# Patient Record
Sex: FEMALE | Race: OTHER | HISPANIC OR LATINO | URBAN - METROPOLITAN AREA
[De-identification: names, ages, dates, MRNs, and addresses within clinical notes are randomized per-mention and may not be internally consistent; named-entity substitution may affect disease eponyms.]

---

## 2018-11-20 ENCOUNTER — EMERGENCY (EMERGENCY)
Facility: HOSPITAL | Age: 58
LOS: 1 days | Discharge: HOME | End: 2018-11-20
Attending: EMERGENCY MEDICINE
Payer: MEDICAID

## 2018-11-20 VITALS
RESPIRATION RATE: 18 BRPM | SYSTOLIC BLOOD PRESSURE: 121 MMHG | HEART RATE: 60 BPM | TEMPERATURE: 97 F | DIASTOLIC BLOOD PRESSURE: 69 MMHG | OXYGEN SATURATION: 99 %

## 2018-11-20 VITALS — WEIGHT: 154.1 LBS | HEIGHT: 64 IN

## 2018-11-20 DIAGNOSIS — Z90.49 ACQUIRED ABSENCE OF OTHER SPECIFIED PARTS OF DIGESTIVE TRACT: Chronic | ICD-10-CM

## 2018-11-20 DIAGNOSIS — Z98.890 OTHER SPECIFIED POSTPROCEDURAL STATES: Chronic | ICD-10-CM

## 2018-11-20 DIAGNOSIS — Z98.49 CATARACT EXTRACTION STATUS, UNSPECIFIED EYE: Chronic | ICD-10-CM

## 2018-11-20 LAB
ALBUMIN SERPL ELPH-MCNC: 3.4 G/DL — LOW (ref 3.5–5)
ALP SERPL-CCNC: 82 U/L — SIGNIFICANT CHANGE UP (ref 40–120)
ALT FLD-CCNC: 68 U/L DA — HIGH (ref 10–60)
ANION GAP SERPL CALC-SCNC: 6 MMOL/L — SIGNIFICANT CHANGE UP (ref 5–17)
AST SERPL-CCNC: 58 U/L — HIGH (ref 10–40)
BASOPHILS # BLD AUTO: 0.1 K/UL — SIGNIFICANT CHANGE UP (ref 0–0.2)
BASOPHILS NFR BLD AUTO: 1.1 % — SIGNIFICANT CHANGE UP (ref 0–2)
BILIRUB SERPL-MCNC: 0.3 MG/DL — SIGNIFICANT CHANGE UP (ref 0.2–1.2)
BUN SERPL-MCNC: 13 MG/DL — SIGNIFICANT CHANGE UP (ref 7–18)
CALCIUM SERPL-MCNC: 8.5 MG/DL — SIGNIFICANT CHANGE UP (ref 8.4–10.5)
CHLORIDE SERPL-SCNC: 108 MMOL/L — SIGNIFICANT CHANGE UP (ref 96–108)
CO2 SERPL-SCNC: 27 MMOL/L — SIGNIFICANT CHANGE UP (ref 22–31)
CREAT SERPL-MCNC: 0.67 MG/DL — SIGNIFICANT CHANGE UP (ref 0.5–1.3)
EOSINOPHIL # BLD AUTO: 0.1 K/UL — SIGNIFICANT CHANGE UP (ref 0–0.5)
EOSINOPHIL NFR BLD AUTO: 1.6 % — SIGNIFICANT CHANGE UP (ref 0–6)
GLUCOSE SERPL-MCNC: 83 MG/DL — SIGNIFICANT CHANGE UP (ref 70–99)
HCT VFR BLD CALC: 36.1 % — SIGNIFICANT CHANGE UP (ref 34.5–45)
HGB BLD-MCNC: 12 G/DL — SIGNIFICANT CHANGE UP (ref 11.5–15.5)
LYMPHOCYTES # BLD AUTO: 2.2 K/UL — SIGNIFICANT CHANGE UP (ref 1–3.3)
LYMPHOCYTES # BLD AUTO: 31.8 % — SIGNIFICANT CHANGE UP (ref 13–44)
MCHC RBC-ENTMCNC: 29.2 PG — SIGNIFICANT CHANGE UP (ref 27–34)
MCHC RBC-ENTMCNC: 33.4 GM/DL — SIGNIFICANT CHANGE UP (ref 32–36)
MCV RBC AUTO: 87.5 FL — SIGNIFICANT CHANGE UP (ref 80–100)
MONOCYTES # BLD AUTO: 0.6 K/UL — SIGNIFICANT CHANGE UP (ref 0–0.9)
MONOCYTES NFR BLD AUTO: 8.4 % — SIGNIFICANT CHANGE UP (ref 2–14)
NEUTROPHILS # BLD AUTO: 4 K/UL — SIGNIFICANT CHANGE UP (ref 1.8–7.4)
NEUTROPHILS NFR BLD AUTO: 57.1 % — SIGNIFICANT CHANGE UP (ref 43–77)
PLATELET # BLD AUTO: 251 K/UL — SIGNIFICANT CHANGE UP (ref 150–400)
POTASSIUM SERPL-MCNC: 4.1 MMOL/L — SIGNIFICANT CHANGE UP (ref 3.5–5.3)
POTASSIUM SERPL-SCNC: 4.1 MMOL/L — SIGNIFICANT CHANGE UP (ref 3.5–5.3)
PROT SERPL-MCNC: 7.3 G/DL — SIGNIFICANT CHANGE UP (ref 6–8.3)
RBC # BLD: 4.12 M/UL — SIGNIFICANT CHANGE UP (ref 3.8–5.2)
RBC # FLD: 11.7 % — SIGNIFICANT CHANGE UP (ref 10.3–14.5)
SODIUM SERPL-SCNC: 141 MMOL/L — SIGNIFICANT CHANGE UP (ref 135–145)
WBC # BLD: 7 K/UL — SIGNIFICANT CHANGE UP (ref 3.8–10.5)
WBC # FLD AUTO: 7 K/UL — SIGNIFICANT CHANGE UP (ref 3.8–10.5)

## 2018-11-20 PROCEDURE — 85652 RBC SED RATE AUTOMATED: CPT

## 2018-11-20 PROCEDURE — 85027 COMPLETE CBC AUTOMATED: CPT

## 2018-11-20 PROCEDURE — 99284 EMERGENCY DEPT VISIT MOD MDM: CPT

## 2018-11-20 PROCEDURE — 96365 THER/PROPH/DIAG IV INF INIT: CPT

## 2018-11-20 PROCEDURE — 70450 CT HEAD/BRAIN W/O DYE: CPT

## 2018-11-20 PROCEDURE — 80053 COMPREHEN METABOLIC PANEL: CPT

## 2018-11-20 PROCEDURE — 96375 TX/PRO/DX INJ NEW DRUG ADDON: CPT

## 2018-11-20 PROCEDURE — 99284 EMERGENCY DEPT VISIT MOD MDM: CPT | Mod: 25

## 2018-11-20 PROCEDURE — 70450 CT HEAD/BRAIN W/O DYE: CPT | Mod: 26

## 2018-11-20 RX ORDER — DIPHENHYDRAMINE HCL 50 MG
25 CAPSULE ORAL ONCE
Qty: 0 | Refills: 0 | Status: COMPLETED | OUTPATIENT
Start: 2018-11-20 | End: 2018-11-20

## 2018-11-20 RX ORDER — SODIUM CHLORIDE 9 MG/ML
1000 INJECTION INTRAMUSCULAR; INTRAVENOUS; SUBCUTANEOUS
Qty: 0 | Refills: 0 | Status: DISCONTINUED | OUTPATIENT
Start: 2018-11-20 | End: 2018-11-24

## 2018-11-20 RX ORDER — METOCLOPRAMIDE HCL 10 MG
10 TABLET ORAL ONCE
Qty: 0 | Refills: 0 | Status: COMPLETED | OUTPATIENT
Start: 2018-11-20 | End: 2018-11-20

## 2018-11-20 RX ADMIN — Medication 10 MILLIGRAM(S): at 22:09

## 2018-11-20 RX ADMIN — SODIUM CHLORIDE 125 MILLILITER(S): 9 INJECTION INTRAMUSCULAR; INTRAVENOUS; SUBCUTANEOUS at 21:25

## 2018-11-20 RX ADMIN — Medication 104 MILLIGRAM(S): at 21:28

## 2018-11-20 RX ADMIN — Medication 25 MILLIGRAM(S): at 21:28

## 2018-11-20 NOTE — ED PROVIDER NOTE - OBJECTIVE STATEMENT
58 y.o. female post menopausal c/o Rt sided subconjunctival hemorrhage, no pain, blurred vision, pt then developed constant bitemporal HA, relieved with Advil, nausea, no photophobia, phonophobia, vomiting, fever, dizziness, LOC, last dose Advil yest.,

## 2018-11-20 NOTE — ED ADULT NURSE NOTE - NSIMPLEMENTINTERV_GEN_ALL_ED
Implemented All Fall Risk Interventions:  [place] to call system. Call bell, personal items and telephone within reach. Instruct patient to call for assistance. Room bathroom lighting operational. Non-slip footwear when patient is off stretcher. Physically safe environment: no spills, clutter or unnecessary equipment. Stretcher in lowest position, wheels locked, appropriate side rails in place. Provide visual cue, wrist band, yellow gown, etc. Monitor gait and stability. Monitor for mental status changes and reorient to person, place, and time. Review medications for side effects contributing to fall risk. Reinforce activity limits and safety measures with patient and family.

## 2018-11-20 NOTE — ED PROVIDER NOTE - PROGRESS NOTE DETAILS
Pt feeling much better, no HA, will d/c home, advised to f/u with PMD.  Daughter claims pt's B/P was elevated 3 dys ago.  Advised low salt diet

## 2018-11-20 NOTE — ED PROVIDER NOTE - ENMT, MLM
Airway patent, Nasal mucosa clear. Mouth with normal mucosa. Throat has no vesicles, no oropharyngeal exudates and uvula is midline. Airway patent, Nasal mucosa clear. Mouth with normal mucosa. Throat has no vesicles, no oropharyngeal exudates and uvula is midline.  sinus- NT to percussion

## 2022-12-30 ENCOUNTER — EMERGENCY (EMERGENCY)
Facility: HOSPITAL | Age: 62
LOS: 1 days | Discharge: HOME | End: 2022-12-30
Attending: EMERGENCY MEDICINE
Payer: MEDICAID

## 2022-12-30 VITALS
WEIGHT: 145.06 LBS | RESPIRATION RATE: 18 BRPM | HEART RATE: 100 BPM | TEMPERATURE: 99 F | SYSTOLIC BLOOD PRESSURE: 154 MMHG | HEIGHT: 63 IN | DIASTOLIC BLOOD PRESSURE: 93 MMHG | OXYGEN SATURATION: 96 %

## 2022-12-30 DIAGNOSIS — Z90.49 ACQUIRED ABSENCE OF OTHER SPECIFIED PARTS OF DIGESTIVE TRACT: Chronic | ICD-10-CM

## 2022-12-30 DIAGNOSIS — Z98.49 CATARACT EXTRACTION STATUS, UNSPECIFIED EYE: Chronic | ICD-10-CM

## 2022-12-30 DIAGNOSIS — Z98.890 OTHER SPECIFIED POSTPROCEDURAL STATES: Chronic | ICD-10-CM

## 2022-12-30 PROCEDURE — 99284 EMERGENCY DEPT VISIT MOD MDM: CPT

## 2022-12-30 RX ORDER — ALBUTEROL 90 UG/1
2.5 AEROSOL, METERED ORAL ONCE
Refills: 0 | Status: COMPLETED | OUTPATIENT
Start: 2022-12-30 | End: 2022-12-30

## 2022-12-30 RX ORDER — KETOROLAC TROMETHAMINE 30 MG/ML
30 SYRINGE (ML) INJECTION ONCE
Refills: 0 | Status: DISCONTINUED | OUTPATIENT
Start: 2022-12-30 | End: 2022-12-30

## 2022-12-30 NOTE — ED ADULT TRIAGE NOTE - CHIEF COMPLAINT QUOTE
Pr stated she has a cough that started on12/26/22 and when she cough's she feels pressure in her chest and her bones ache.

## 2022-12-31 VITALS
RESPIRATION RATE: 16 BRPM | SYSTOLIC BLOOD PRESSURE: 125 MMHG | TEMPERATURE: 99 F | DIASTOLIC BLOOD PRESSURE: 65 MMHG | OXYGEN SATURATION: 100 % | HEART RATE: 96 BPM

## 2022-12-31 LAB
ALBUMIN SERPL ELPH-MCNC: 3.1 G/DL — LOW (ref 3.5–5)
ALP SERPL-CCNC: 84 U/L — SIGNIFICANT CHANGE UP (ref 40–120)
ALT FLD-CCNC: 62 U/L DA — HIGH (ref 10–60)
ANION GAP SERPL CALC-SCNC: 8 MMOL/L — SIGNIFICANT CHANGE UP (ref 5–17)
AST SERPL-CCNC: 44 U/L — HIGH (ref 10–40)
BASOPHILS # BLD AUTO: 0.04 K/UL — SIGNIFICANT CHANGE UP (ref 0–0.2)
BASOPHILS NFR BLD AUTO: 0.5 % — SIGNIFICANT CHANGE UP (ref 0–2)
BILIRUB SERPL-MCNC: 0.2 MG/DL — SIGNIFICANT CHANGE UP (ref 0.2–1.2)
BUN SERPL-MCNC: 12 MG/DL — SIGNIFICANT CHANGE UP (ref 7–18)
CALCIUM SERPL-MCNC: 8.8 MG/DL — SIGNIFICANT CHANGE UP (ref 8.4–10.5)
CHLORIDE SERPL-SCNC: 103 MMOL/L — SIGNIFICANT CHANGE UP (ref 96–108)
CO2 SERPL-SCNC: 29 MMOL/L — SIGNIFICANT CHANGE UP (ref 22–31)
CREAT SERPL-MCNC: 0.69 MG/DL — SIGNIFICANT CHANGE UP (ref 0.5–1.3)
EGFR: 99 ML/MIN/1.73M2 — SIGNIFICANT CHANGE UP
EOSINOPHIL # BLD AUTO: 0.09 K/UL — SIGNIFICANT CHANGE UP (ref 0–0.5)
EOSINOPHIL NFR BLD AUTO: 1.1 % — SIGNIFICANT CHANGE UP (ref 0–6)
FLUAV AG NPH QL: SIGNIFICANT CHANGE UP
FLUBV AG NPH QL: SIGNIFICANT CHANGE UP
GLUCOSE SERPL-MCNC: 131 MG/DL — HIGH (ref 70–99)
HCT VFR BLD CALC: 36.4 % — SIGNIFICANT CHANGE UP (ref 34.5–45)
HGB BLD-MCNC: 11.8 G/DL — SIGNIFICANT CHANGE UP (ref 11.5–15.5)
IMM GRANULOCYTES NFR BLD AUTO: 0.4 % — SIGNIFICANT CHANGE UP (ref 0–0.9)
LYMPHOCYTES # BLD AUTO: 1.61 K/UL — SIGNIFICANT CHANGE UP (ref 1–3.3)
LYMPHOCYTES # BLD AUTO: 19.4 % — SIGNIFICANT CHANGE UP (ref 13–44)
MCHC RBC-ENTMCNC: 28.6 PG — SIGNIFICANT CHANGE UP (ref 27–34)
MCHC RBC-ENTMCNC: 32.4 GM/DL — SIGNIFICANT CHANGE UP (ref 32–36)
MCV RBC AUTO: 88.1 FL — SIGNIFICANT CHANGE UP (ref 80–100)
MONOCYTES # BLD AUTO: 0.76 K/UL — SIGNIFICANT CHANGE UP (ref 0–0.9)
MONOCYTES NFR BLD AUTO: 9.2 % — SIGNIFICANT CHANGE UP (ref 2–14)
NEUTROPHILS # BLD AUTO: 5.75 K/UL — SIGNIFICANT CHANGE UP (ref 1.8–7.4)
NEUTROPHILS NFR BLD AUTO: 69.4 % — SIGNIFICANT CHANGE UP (ref 43–77)
NRBC # BLD: 0 /100 WBCS — SIGNIFICANT CHANGE UP (ref 0–0)
NT-PROBNP SERPL-SCNC: 23 PG/ML — SIGNIFICANT CHANGE UP (ref 0–125)
PLATELET # BLD AUTO: 289 K/UL — SIGNIFICANT CHANGE UP (ref 150–400)
POTASSIUM SERPL-MCNC: 3.5 MMOL/L — SIGNIFICANT CHANGE UP (ref 3.5–5.3)
POTASSIUM SERPL-SCNC: 3.5 MMOL/L — SIGNIFICANT CHANGE UP (ref 3.5–5.3)
PROT SERPL-MCNC: 7.1 G/DL — SIGNIFICANT CHANGE UP (ref 6–8.3)
RBC # BLD: 4.13 M/UL — SIGNIFICANT CHANGE UP (ref 3.8–5.2)
RBC # FLD: 12.4 % — SIGNIFICANT CHANGE UP (ref 10.3–14.5)
SARS-COV-2 RNA SPEC QL NAA+PROBE: DETECTED
SODIUM SERPL-SCNC: 140 MMOL/L — SIGNIFICANT CHANGE UP (ref 135–145)
TROPONIN I, HIGH SENSITIVITY RESULT: 5.6 NG/L — SIGNIFICANT CHANGE UP
WBC # BLD: 8.28 K/UL — SIGNIFICANT CHANGE UP (ref 3.8–10.5)
WBC # FLD AUTO: 8.28 K/UL — SIGNIFICANT CHANGE UP (ref 3.8–10.5)

## 2022-12-31 PROCEDURE — 96374 THER/PROPH/DIAG INJ IV PUSH: CPT

## 2022-12-31 PROCEDURE — 83880 ASSAY OF NATRIURETIC PEPTIDE: CPT

## 2022-12-31 PROCEDURE — 96375 TX/PRO/DX INJ NEW DRUG ADDON: CPT

## 2022-12-31 PROCEDURE — 85025 COMPLETE CBC W/AUTO DIFF WBC: CPT

## 2022-12-31 PROCEDURE — 36415 COLL VENOUS BLD VENIPUNCTURE: CPT

## 2022-12-31 PROCEDURE — 94640 AIRWAY INHALATION TREATMENT: CPT

## 2022-12-31 PROCEDURE — 99284 EMERGENCY DEPT VISIT MOD MDM: CPT | Mod: 25

## 2022-12-31 PROCEDURE — 80053 COMPREHEN METABOLIC PANEL: CPT

## 2022-12-31 PROCEDURE — 87637 SARSCOV2&INF A&B&RSV AMP PRB: CPT

## 2022-12-31 PROCEDURE — 71045 X-RAY EXAM CHEST 1 VIEW: CPT | Mod: 26

## 2022-12-31 PROCEDURE — 71045 X-RAY EXAM CHEST 1 VIEW: CPT

## 2022-12-31 PROCEDURE — 84484 ASSAY OF TROPONIN QUANT: CPT

## 2022-12-31 RX ORDER — ALBUTEROL 90 UG/1
2 AEROSOL, METERED ORAL
Qty: 56 | Refills: 0
Start: 2022-12-31 | End: 2023-01-06

## 2022-12-31 RX ADMIN — Medication 30 MILLIGRAM(S): at 00:44

## 2022-12-31 RX ADMIN — Medication 125 MILLIGRAM(S): at 00:37

## 2022-12-31 RX ADMIN — Medication 30 MILLIGRAM(S): at 00:37

## 2022-12-31 RX ADMIN — ALBUTEROL 2.5 MILLIGRAM(S): 90 AEROSOL, METERED ORAL at 00:42

## 2022-12-31 RX ADMIN — Medication 100 MILLIGRAM(S): at 00:37

## 2022-12-31 NOTE — ED PROVIDER NOTE - PROGRESS NOTE DETAILS
feeling improved. labs unremarkable. cxr WNL. covid+. will dc. rx for albuterol and tesslon. f/u with PMD. return precautions discussed.

## 2022-12-31 NOTE — ED PROVIDER NOTE - NSFOLLOWUPINSTRUCTIONS_ED_ALL_ED_FT
[facility]                                                                                                                                     COVID-19      COVID-19, or coronavirus disease 2019, is a systemic infection that is caused by a novel coronavirus called SARS-CoV-2. In some people, the virus may not cause any symptoms. In others, it may cause mild or severe symptoms. Some people with severe infection develop severe disease, which may lead to acute respiratory distress syndrome and shock.      What are the causes?  The human body, showing how the coronavirus travels from the air to a person's lungs.   This illness is caused by a virus. The virus may be in the air or on surfaces as droplets or aerosols of various sizes. You may catch the virus by:  •Breathing in droplets from an infected person. Droplets can be spread by a person breathing, speaking, singing, coughing, or sneezing.      •Touching something, like a table or a doorknob, that was exposed to the virus (is contaminated) and then touching your mouth, nose, or eyes.        What increases the risk?    Risk for infection:     You are more likely to become infected with the COVID-19 virus if:  •You are within 6 ft (1.8 m) of a person with COVID-19 for 15 minutes or longer.      •You are providing care for a person who is infected with COVID-19.      •You are in close personal contact with other people. Close personal contact includes hugging, kissing, or sharing eating or drinking utensils.      Risk for serious illness due to COVID-19:    You are more likely to become seriously ill from the COVID-19 virus if:  •You have cancer.    •You have a long-term (chronic) disease, such as:  •Chronic lung disease, including pulmonary embolism, chronic obstructive pulmonary disease, and cystic fibrosis.      •Long-term disease that lowers your body's ability to fight infection (immunocompromise).      •Serious cardiac conditions, such as heart failure, coronary artery disease, or cardiomyopathy.      •Diabetes.      •Chronic kidney disease.      •Liver diseases, including cirrhosis, nonalcoholic fatty liver disease, alcoholic liver disease, or autoimmune hepatitis.        •You are obese.      •You are pregnant or recently pregnant.      •You have sickle cell disease.        What are the signs or symptoms?    Symptoms of this condition can range from mild to severe. Symptoms may appear any time from 2 to 14 days after being exposed to the virus. They include:  •Fever or chills.      •Difficulty breathing or having shortness of breath.      •Feeling tired or very tired.      •Headaches, body aches, or muscle aches.      •Runny or stuffy nose, sneezing, cough, sore throat.      •New loss of taste or smell. This is rare.      Some people may also have stomach problems, such as nausea, vomiting, or diarrhea.    Other people may not have any symptoms of COVID-19.      How is this diagnosed?  A sample being collected by swabbing the nose.   This condition may be diagnosed by testing samples to check for the COVID-19 virus. The most common tests are the PCR test and the antigen test. Tests may be done in the lab or at home. They include:  •Using a swab to take a sample of fluid from the back of your nose and throat (nasopharyngeal fluid), from your nose, or from your throat.      •Testing a sample of saliva from your mouth.      •Testing a sample of coughed-up mucus from your lungs (sputum).        How is this treated?    Treatment for COVID-19 infection depends on the severity of the condition.  •Mild symptoms can be managed at home with rest, fluids, and over-the-counter medicines.    •Serious symptoms may be treated in a hospital intensive care unit, or ICU. Treatment in the ICU may include:  •Supplemental oxygen. Extra oxygen is given through a tube in the nose, a face mask, or a ribera.    •Medicines. You may be given medicines:  •To help your body fight off certain viruses that can cause disease (antivirals).      •To reduce inflammation and suppress the immune system (corticosteroids).      •To prevent or treat blood clots, if they develop (antithrombotics).        •Antibodies made in a lab that can restore or strengthen your body's natural immune system (monoclonal antibody).      •Positioning you to lie on your stomach (prone position). This makes it easier for oxygen to get into the lungs.      •Infection control measures.        If you are at risk for more serious illness due to COVID-19, your health care provider may prescribe a combination of two long-acting monoclonal antibodies, administered together every 6 months.      How is this prevented?    To protect yourself:   •Get vaccinated. COVID-19 vaccines are available for everyone aged 6 months and older.  •Vaccination is recommended for women who are pregnant, may become pregnant, or are breastfeeding.      •Patients who require major surgery should plan their vaccination for several days before or after the surgery.      •Talk to your health care provider about receiving experimental monoclonal antibodies. This treatment has been approved under emergency use authorization to prevent severe illness before or after you are exposed to the COVID-19 virus. You may be given monoclonal antibodies if:  •You are moderately or severely immunocompromised. This includes treatments that lower your immune response. People with immunocompromise may not develop protection against COVID-19 when they are vaccinated.      •You cannot be vaccinated. You may not receive a vaccine if you have a severe allergic reaction to the vaccine or its components.      •You are not fully vaccinated.      •You are in close contact with a person who is infected with SARS-CoV-2, or at high risk of exposure to SARS-CoV-2, in an institution in which COVID-19 is occurring.      •You are at risk of illness from new variants of the COVID-19 virus.        To protect others:     If you have symptoms of COVID-19, take steps to prevent the virus from spreading to others.  •Stay home. Leave your house only to seek medical care. Do not use public transport, if possible.      •Do not travel while you are sick.      •Wash your hands often with soap and water for at least 20 seconds. If soap and water are not available, use alcohol-based hand .      •Stay away from other members of your household. Let healthy household members care for children and pets, if possible. If you have to care for children or pets, wash your hands often and wear a mask. If possible, stay in your own room, separate from others. Use a different bathroom.      •Make sure that all people in your household wash their hands well and often.      •Cough or sneeze into a tissue or your sleeve or elbow. Do not cough or sneeze into your hand or into the air.        Where to find more information    •Centers for Disease Control and Prevention: www.cdc.gov/coronavirus      •World Health Organization: www.who.int/health-topics/coronavirus        Get help right away if:    •You have trouble breathing.      •You have pain or pressure in your chest.      •You have confusion.      •You have bluish lips and fingernails.      •You have difficulty waking from sleep.      •You have symptoms that get worse.      These symptoms may be an emergency. Get help right away. Call 911.   • Do not wait to see if the symptoms will go away.        •  Do not drive yourself to the hospital.         Summary    •COVID-19 is a respiratory infection that is caused by a virus.      •Some people with a severe COVID-19 infection develop severe disease, which may lead to acute respiratory distress syndrome and shock.      •The virus that causes COVID-19 is contagious. This means that it can spread from person to person through droplets from breathing, speaking, singing, coughing, or sneezing.      •Mild symptoms of COVID-19 can be managed at home with rest, fluids, and over-the-counter medicines.      This information is not intended to replace advice given to you by your health care provider. Make sure you discuss any questions you have with your health care provider.      Document Revised: 11/03/2022 Document Reviewed: 11/02/2022    [employer] Patient Education © 2022 [employer]

## 2022-12-31 NOTE — ED PROVIDER NOTE - PATIENT PORTAL LINK FT
You can access the FollowMyHealth Patient Portal offered by [facility] by registering at the following website: [email or web address]. By joining [employer]’s FollowMyHealth portal, you will also be able to view your health information using other applications (apps) compatible with our system.

## 2022-12-31 NOTE — ED ADULT NURSE NOTE - NSIMPLEMENTINTERV_GEN_ALL_ED
Implemented All Universal Safety Interventions:  [place] to call system. Call bell, personal items and telephone within reach. Instruct patient to call for assistance. Room bathroom lighting operational. Non-slip footwear when patient is off stretcher. Physically safe environment: no spills, clutter or unnecessary equipment. Stretcher in lowest position, wheels locked, appropriate side rails in place.

## 2022-12-31 NOTE — ED PROVIDER NOTE - OBJECTIVE STATEMENT
62 year old female denies PMH coming in with 4 days of cough, chest pressure with coughing, body aches. states hasn't improved with otc cough medications. denies all other complaints at this time.

## 2023-12-10 ENCOUNTER — EMERGENCY (EMERGENCY)
Facility: HOSPITAL | Age: 63
LOS: 1 days | Discharge: HOME | End: 2023-12-10
Attending: EMERGENCY MEDICINE
Payer: MEDICAID

## 2023-12-10 VITALS
TEMPERATURE: 99 F | HEIGHT: 64.17 IN | HEART RATE: 62 BPM | SYSTOLIC BLOOD PRESSURE: 150 MMHG | OXYGEN SATURATION: 97 % | WEIGHT: 159.39 LBS | RESPIRATION RATE: 20 BRPM | DIASTOLIC BLOOD PRESSURE: 81 MMHG

## 2023-12-10 DIAGNOSIS — Z90.49 ACQUIRED ABSENCE OF OTHER SPECIFIED PARTS OF DIGESTIVE TRACT: Chronic | ICD-10-CM

## 2023-12-10 DIAGNOSIS — Z98.49 CATARACT EXTRACTION STATUS, UNSPECIFIED EYE: Chronic | ICD-10-CM

## 2023-12-10 DIAGNOSIS — Z98.890 OTHER SPECIFIED POSTPROCEDURAL STATES: Chronic | ICD-10-CM

## 2023-12-10 PROCEDURE — 96372 THER/PROPH/DIAG INJ SC/IM: CPT

## 2023-12-10 PROCEDURE — 99284 EMERGENCY DEPT VISIT MOD MDM: CPT

## 2023-12-10 PROCEDURE — 73502 X-RAY EXAM HIP UNI 2-3 VIEWS: CPT

## 2023-12-10 PROCEDURE — 73502 X-RAY EXAM HIP UNI 2-3 VIEWS: CPT | Mod: 26,LT

## 2023-12-10 PROCEDURE — 99283 EMERGENCY DEPT VISIT LOW MDM: CPT | Mod: 25

## 2023-12-10 RX ORDER — CYCLOBENZAPRINE HYDROCHLORIDE 10 MG/1
1 TABLET, FILM COATED ORAL
Qty: 9 | Refills: 0
Start: 2023-12-10 | End: 2023-12-12

## 2023-12-10 RX ORDER — LIDOCAINE 4 G/100G
1 CREAM TOPICAL
Qty: 1 | Refills: 0
Start: 2023-12-10 | End: 2023-12-14

## 2023-12-10 RX ORDER — KETOROLAC TROMETHAMINE 30 MG/ML
30 SYRINGE (ML) INJECTION ONCE
Refills: 0 | Status: DISCONTINUED | OUTPATIENT
Start: 2023-12-10 | End: 2023-12-10

## 2023-12-10 RX ORDER — LIDOCAINE 4 G/100G
1 CREAM TOPICAL ONCE
Refills: 0 | Status: COMPLETED | OUTPATIENT
Start: 2023-12-10 | End: 2023-12-10

## 2023-12-10 RX ADMIN — LIDOCAINE 1 PATCH: 4 CREAM TOPICAL at 12:39

## 2023-12-10 RX ADMIN — Medication 30 MILLIGRAM(S): at 12:39

## 2023-12-10 RX ADMIN — Medication 30 MILLIGRAM(S): at 13:09

## 2023-12-10 NOTE — ED PROVIDER NOTE - CLINICAL SUMMARY MEDICAL DECISION MAKING FREE TEXT BOX
Pt p/w atraumatic low back pain with (-) fevers, chills, (-) saddle anesthesia or perianal numbness,(-) rash, (-) IVDU hx, (-) urinary or bowel incontinence/retention, or focal neurological deficits. DDx: Likely musculoskeletal back pain / pain to sacroiliac joint. Considered DDX: caudia equina syndrome, vertebral compression fracture, epidural abscess, discitis, vertebral osteomyelitis, cord compression, or bone malignancy; but these are unlikely due to the HPI and exam.  - XR of hip / pelvis  - Pain control:  lidocaine patch, NSAIDs  - Ambulation and discharge.

## 2023-12-10 NOTE — ED PROVIDER NOTE - NSFOLLOWUPINSTRUCTIONS_ED_ALL_ED_FT
Back Pain    Back pain is very common in adults. The cause of back pain is rarely dangerous and the pain often gets better over time. The cause of your back pain may not be known and may include strain of muscles or ligaments, degeneration of the spinal disks, or arthritis. Occasionally the pain may radiate down your leg(s). Over-the-counter medicines to reduce pain and inflammation are often the most helpful. Stretching and remaining active frequently helps the healing process.     SEEK IMMEDIATE MEDICAL CARE IF YOU HAVE ANY OF THE FOLLOWING SYMPTOMS: bowel or bladder control problems, unusual weakness or numbness in your arms or legs, nausea or vomiting, abdominal pain, fever, dizziness/lightheadedness.      1. Over the counter SALONPAS SPRAY or LIDOCAINE patches  2.Try hot compresses, showers and baths  3. IF  you get fevers or neurologic deficits - decreased sensation, weakness, tingling in your arms or legs OR  trouble urinating, weight loss or night sweats, then RETURN to the ER or see your doctor ASAP    Dolor de espalda    El dolor de espalda es muy común en los adultos. La causa del dolor de espalda danielito vez es peligrosa y el dolor suele mejorar con el tiempo. Es posible que se desconozca la causa de dixon dolor de espalda y puede incluir distensión de músculos o ligamentos, degeneración de los discos espinales o artritis. Ocasionalmente, el dolor puede irradiarse hacia las piernas. Los medicamentos de venta alo para reducir el dolor y la inflamación suelen ser los más útiles. Estirarse y permanecer activo con frecuencia ayuda al proceso de curación.    BUSQUE ATENCIÓN MÉDICA INMEDIATA SI TIENE ALGUNO DE LOS SIGUIENTES SÍNTOMAS: problemas de control de los intestinos o la vejiga, debilidad inusual o entumecimiento en los brazos o las piernas, náuseas o vómitos, dolor abdominal, fiebre, mareos o aturdimiento.      1. Parches de venta alo SALONPAS SPRAY o LIDOCAÍNA  2.Prueba compresas calientes, duchas y linda.  3. SI tiene fiebre o déficits neurológicos (disminución de la sensación, debilidad, hormigueo en los brazos o las piernas O dificultad para orinar, pérdida de peso o sudores nocturnos), REGRESE a la grace de emergencias o consulte a dixon médico lo antes posible.

## 2023-12-10 NOTE — ED PROVIDER NOTE - CARE PROVIDER_API CALL
Jonathan Stephens)  Orthopaedic Surgery  [address], [address]  [place] [address]  Phone: [phone]  Fax: [phone]  Follow Up Time:     Mendel Babb  Neurosurgery  [address], [address]  [place] [address]  Phone: [phone]  Fax: [phone]  Follow Up Time:    Jonathan Stephens)  Orthopaedic Surgery  [address], [address]  [place] [address]  Phone: [phone]  Fax: [phone]  Follow Up Time:     Mendel Babb  Neurosurgery  [address]  [place] [address]  Phone: [phone]  Fax: [phone]  Follow Up Time:

## 2023-12-10 NOTE — ED PROVIDER NOTE - PROVIDER TOKENS
PROVIDER:[TOKEN:[[id number]]],PROVIDER:[TOKEN:[[id number]]] PROVIDER:[TOKEN:[[id number]]],PROVIDER:[TOKEN:[[id number]]]

## 2023-12-10 NOTE — ED PROVIDER NOTE - ATTENDING APP SHARED VISIT CONTRIBUTION OF CARE
I conducted a face-to-face interaction with patient and I agree with NP documentation and plan.  Pt presents with left lower back pain/buttock pain  A/P:  X-ray and pain control

## 2023-12-10 NOTE — ED PROVIDER NOTE - PHYSICAL EXAMINATION
GEN:   comfortable, in no apparent distress, AOx3  EYES:   PERRL, extra-occular movements intact  HEENT:   airway patent, moist mucosal membranes, uvula midline  CV:  RRR, Pulses- Radial: 2+ bilateral and equal  RESP:   clear to auscultation bilaterally, non-labored, speaking in full sentences  ABD:   soft, non tender, no guarding  MSK:   mild ttp in area of left sacroiliac joint.  + straight leg raise on right.  Back is symmetrical, scapulae are symmetric. Neck has full range of motion. No spinal or paraspinal tenderness, deformity or step-offs. All limbs equally strong 5+ bilaterally. Strong ankle dorsiflexion and plantar flexion against resistance bilaterally. Strong flexion/extension of big toe bilaterally. Pt is able to walk with steady gait.  rest of msk: no musculoskeletal tenderness, 5/5 strength, moving all extremities  SKIN:   dry, intact, no rash  NEURO:   AOx3, no focal weakness or loss of sensation, gait normal, GCS 15  PSYCH: calm, cooperative, no apparent risk to self and others

## 2023-12-10 NOTE — ED PROVIDER NOTE - OBJECTIVE STATEMENT
63-year-old female history of cholecystectomy presents for left lower back pain and left buttock pain x 6 days.  Patient states that it got worse this morning prompting her visit to the emergency room.  Patient states that she took Tylenol last night with no relief but nothing today for the pain.  Patient denies heavy lifting or trauma.  Patient denies numbness or tingling in extremities, denies loss of bladder of bowel function, denies fevers or chills and denies saddle anesthesia.  Is able to ambulate.

## 2023-12-10 NOTE — ED PROVIDER NOTE - CARE PROVIDERS DIRECT ADDRESSES
[email or web address],DirectAddress_Unknown [email or web address],DirectAddress_Unknown

## 2023-12-10 NOTE — ED ADULT NURSE NOTE - NSFALLUNIVINTERV_ED_ALL_ED
Bed/Stretcher in lowest position, wheels locked, appropriate side rails in place/Call bell, personal items and telephone in reach/Instruct patient to call for assistance before getting out of bed/chair/stretcher/Non-slip footwear applied when patient is off stretcher/[place] to call system/Physically safe environment - no spills, clutter or unnecessary equipment/Purposeful proactive rounding/Room/bathroom lighting operational, light cord in reach Bed/Stretcher in lowest position, wheels locked, appropriate side rails in place/Call bell, personal items and telephone in reach/Instruct patient to call for assistance before getting out of bed/chair/stretcher/Non-slip footwear applied when patient is off stretcher/[place] to call system/Physically safe environment - no spills, clutter or unnecessary equipment/Purposeful proactive rounding/Room/bathroom lighting operational, light cord in reach

## 2023-12-10 NOTE — ED ADULT NURSE NOTE - CAS ELECT INFOMATION PROVIDED
CC:  Louis Cardoza is here today for Transitional Care Management     Medications: medications verified and updated  Refills needed today?  NO  denies Latex allergy or sensitivity  Patient would like communication of their results via:    [employer]    Cell Phone:   Telephone Information:   Mobile [phone]     Okay to leave a message containing results? Yes  Tobacco history: verified  Patient's current [facility] status: Active.    Pharmacy Verified?  Yes    Health Maintenance Due   Topic Date Due   • Pneumococcal Vaccine 0-64 (2 - PCV) 02/10/2021       Patient is due for topics as listed above but is not proceeding with Immunization(s) Pneumococcal at this time.            DC instructions

## 2023-12-10 NOTE — ED PROVIDER NOTE - PATIENT PORTAL LINK FT
You can access the FollowMyHealth Patient Portal offered by [facility] by registering at the following website: [email or web address]. By joining [employer]’s FollowMyHealth portal, you will also be able to view your health information using other applications (apps) compatible with our system. You can access the FollowMyHealth Patient Portal offered by [facility] by registering at the following website: [email or web address]. By joining [email or web address]’s FollowMyHealth portal, you will also be able to view your health information using other applications (apps) compatible with our system. Full range of motion of upper and lower extremities, no joint tenderness/swelling.

## 2023-12-10 NOTE — ED PROVIDER NOTE - NS ED ATTENDING STATEMENT MOD
This was a shared visit with the EFRA. I reviewed and verified the documentation and independently performed the documented:

## 2023-12-10 NOTE — ED ADULT NURSE NOTE - CAS TRG GEN SKIN COLOR
Continue treatment with metoprolol amlodipine Lasix 20 low-salt diet blood pressure controlled
History of nonsustained ventricular tachycardia no evidence of any recurrence continue current therapy with metoprolol patient evaluated by cardiology had a nuclear stress test done negative for ischemia continue current treatment aspirin statin
Hypertensive urgency resolved now blood pressure controlled with amlodipine metoprolol low-salt diet
Lab Results   Component Value Date    HGBA1C 7 1 (H) 12/05/2022   Continue on current treatment with regular insulin with meal  Lantus    Solostar 15 units daily and xiduo home blood sugar monitoring diabetic diet hypoglycemia protocol in place advised dilated eye exam once a year foot exam normal last A1c satisfactory
No chest pain or difficulty breathing last echocardiogram ejection fraction 45 to 50% evaluated by cardiology recent stress test done nuclear was negative for ischemia continue metoprolol amlodipine and Lasix 20 mg with aspirin and statin
Recently had nuclear stress test done about a week and half ago normal reviewed negative for ischemia continue metoprolol and nitroglycerin as needed symptoms controlled
Wt Readings from Last 3 Encounters:   12/07/22 74 4 kg (164 lb)   09/27/22 74 4 kg (164 lb)   08/30/22 72 6 kg (160 lb)       Patient evaluated by is a Cardiology last week continue metoprolol amlodipine Lasix fluid restriction daily weight monitoring no evidence any CHF at present time   CHF controlled evaluated by cardiology last echocardiogram ejection fraction 45 to 23% with diastolic dysfunction nuclear stress test was done negative for ischemia
Normal for race

## 2023-12-10 NOTE — ED PROVIDER NOTE - PROGRESS NOTE DETAILS
Xr shows no acute findings on my wet read.  Patient nontoxic and medically stable for discharge. Results  discussed with patient. Return precautions provided and patient understands to return to the ED for concerning or worsening signs and symptoms. Instructed to follow up with primary care physician and back specialist and agreeable. Patient's questions answered.

## 2023-12-13 ENCOUNTER — EMERGENCY (EMERGENCY)
Facility: HOSPITAL | Age: 63
LOS: 1 days | Discharge: HOME | End: 2023-12-13
Attending: EMERGENCY MEDICINE
Payer: MEDICAID

## 2023-12-13 VITALS
SYSTOLIC BLOOD PRESSURE: 144 MMHG | WEIGHT: 154.32 LBS | RESPIRATION RATE: 18 BRPM | OXYGEN SATURATION: 98 % | DIASTOLIC BLOOD PRESSURE: 87 MMHG | HEIGHT: 64.17 IN | TEMPERATURE: 98 F | HEART RATE: 64 BPM

## 2023-12-13 VITALS
RESPIRATION RATE: 18 BRPM | HEART RATE: 60 BPM | SYSTOLIC BLOOD PRESSURE: 107 MMHG | DIASTOLIC BLOOD PRESSURE: 61 MMHG | OXYGEN SATURATION: 96 % | TEMPERATURE: 98 F

## 2023-12-13 DIAGNOSIS — Z90.49 ACQUIRED ABSENCE OF OTHER SPECIFIED PARTS OF DIGESTIVE TRACT: Chronic | ICD-10-CM

## 2023-12-13 DIAGNOSIS — Z98.49 CATARACT EXTRACTION STATUS, UNSPECIFIED EYE: Chronic | ICD-10-CM

## 2023-12-13 DIAGNOSIS — Z98.890 OTHER SPECIFIED POSTPROCEDURAL STATES: Chronic | ICD-10-CM

## 2023-12-13 LAB
ANION GAP SERPL CALC-SCNC: 3 MMOL/L — LOW (ref 5–17)
ANION GAP SERPL CALC-SCNC: 3 MMOL/L — LOW (ref 5–17)
BASOPHILS # BLD AUTO: 0.03 K/UL — SIGNIFICANT CHANGE UP (ref 0–0.2)
BASOPHILS # BLD AUTO: 0.03 K/UL — SIGNIFICANT CHANGE UP (ref 0–0.2)
BASOPHILS NFR BLD AUTO: 0.5 % — SIGNIFICANT CHANGE UP (ref 0–2)
BASOPHILS NFR BLD AUTO: 0.5 % — SIGNIFICANT CHANGE UP (ref 0–2)
BUN SERPL-MCNC: 19 MG/DL — HIGH (ref 7–18)
BUN SERPL-MCNC: 19 MG/DL — HIGH (ref 7–18)
CALCIUM SERPL-MCNC: 9.1 MG/DL — SIGNIFICANT CHANGE UP (ref 8.4–10.5)
CALCIUM SERPL-MCNC: 9.1 MG/DL — SIGNIFICANT CHANGE UP (ref 8.4–10.5)
CHLORIDE SERPL-SCNC: 103 MMOL/L — SIGNIFICANT CHANGE UP (ref 96–108)
CHLORIDE SERPL-SCNC: 103 MMOL/L — SIGNIFICANT CHANGE UP (ref 96–108)
CO2 SERPL-SCNC: 31 MMOL/L — SIGNIFICANT CHANGE UP (ref 22–31)
CO2 SERPL-SCNC: 31 MMOL/L — SIGNIFICANT CHANGE UP (ref 22–31)
CREAT SERPL-MCNC: 0.63 MG/DL — SIGNIFICANT CHANGE UP (ref 0.5–1.3)
CREAT SERPL-MCNC: 0.63 MG/DL — SIGNIFICANT CHANGE UP (ref 0.5–1.3)
EGFR: 101 ML/MIN/1.73M2 — SIGNIFICANT CHANGE UP
EGFR: 101 ML/MIN/1.73M2 — SIGNIFICANT CHANGE UP
EOSINOPHIL # BLD AUTO: 0.09 K/UL — SIGNIFICANT CHANGE UP (ref 0–0.5)
EOSINOPHIL # BLD AUTO: 0.09 K/UL — SIGNIFICANT CHANGE UP (ref 0–0.5)
EOSINOPHIL NFR BLD AUTO: 1.4 % — SIGNIFICANT CHANGE UP (ref 0–6)
EOSINOPHIL NFR BLD AUTO: 1.4 % — SIGNIFICANT CHANGE UP (ref 0–6)
GLUCOSE SERPL-MCNC: 91 MG/DL — SIGNIFICANT CHANGE UP (ref 70–99)
GLUCOSE SERPL-MCNC: 91 MG/DL — SIGNIFICANT CHANGE UP (ref 70–99)
HCT VFR BLD CALC: 40.5 % — SIGNIFICANT CHANGE UP (ref 34.5–45)
HCT VFR BLD CALC: 40.5 % — SIGNIFICANT CHANGE UP (ref 34.5–45)
HGB BLD-MCNC: 13.4 G/DL — SIGNIFICANT CHANGE UP (ref 11.5–15.5)
HGB BLD-MCNC: 13.4 G/DL — SIGNIFICANT CHANGE UP (ref 11.5–15.5)
IMM GRANULOCYTES NFR BLD AUTO: 0.3 % — SIGNIFICANT CHANGE UP (ref 0–0.9)
IMM GRANULOCYTES NFR BLD AUTO: 0.3 % — SIGNIFICANT CHANGE UP (ref 0–0.9)
LYMPHOCYTES # BLD AUTO: 2.2 K/UL — SIGNIFICANT CHANGE UP (ref 1–3.3)
LYMPHOCYTES # BLD AUTO: 2.2 K/UL — SIGNIFICANT CHANGE UP (ref 1–3.3)
LYMPHOCYTES # BLD AUTO: 34 % — SIGNIFICANT CHANGE UP (ref 13–44)
LYMPHOCYTES # BLD AUTO: 34 % — SIGNIFICANT CHANGE UP (ref 13–44)
MCHC RBC-ENTMCNC: 29.2 PG — SIGNIFICANT CHANGE UP (ref 27–34)
MCHC RBC-ENTMCNC: 29.2 PG — SIGNIFICANT CHANGE UP (ref 27–34)
MCHC RBC-ENTMCNC: 33.1 GM/DL — SIGNIFICANT CHANGE UP (ref 32–36)
MCHC RBC-ENTMCNC: 33.1 GM/DL — SIGNIFICANT CHANGE UP (ref 32–36)
MCV RBC AUTO: 88.2 FL — SIGNIFICANT CHANGE UP (ref 80–100)
MCV RBC AUTO: 88.2 FL — SIGNIFICANT CHANGE UP (ref 80–100)
MONOCYTES # BLD AUTO: 0.57 K/UL — SIGNIFICANT CHANGE UP (ref 0–0.9)
MONOCYTES # BLD AUTO: 0.57 K/UL — SIGNIFICANT CHANGE UP (ref 0–0.9)
MONOCYTES NFR BLD AUTO: 8.8 % — SIGNIFICANT CHANGE UP (ref 2–14)
MONOCYTES NFR BLD AUTO: 8.8 % — SIGNIFICANT CHANGE UP (ref 2–14)
NEUTROPHILS # BLD AUTO: 3.57 K/UL — SIGNIFICANT CHANGE UP (ref 1.8–7.4)
NEUTROPHILS # BLD AUTO: 3.57 K/UL — SIGNIFICANT CHANGE UP (ref 1.8–7.4)
NEUTROPHILS NFR BLD AUTO: 55 % — SIGNIFICANT CHANGE UP (ref 43–77)
NEUTROPHILS NFR BLD AUTO: 55 % — SIGNIFICANT CHANGE UP (ref 43–77)
NRBC # BLD: 0 /100 WBCS — SIGNIFICANT CHANGE UP (ref 0–0)
NRBC # BLD: 0 /100 WBCS — SIGNIFICANT CHANGE UP (ref 0–0)
PLATELET # BLD AUTO: 295 K/UL — SIGNIFICANT CHANGE UP (ref 150–400)
PLATELET # BLD AUTO: 295 K/UL — SIGNIFICANT CHANGE UP (ref 150–400)
POTASSIUM SERPL-MCNC: 4.8 MMOL/L — SIGNIFICANT CHANGE UP (ref 3.5–5.3)
POTASSIUM SERPL-MCNC: 4.8 MMOL/L — SIGNIFICANT CHANGE UP (ref 3.5–5.3)
POTASSIUM SERPL-SCNC: 4.8 MMOL/L — SIGNIFICANT CHANGE UP (ref 3.5–5.3)
POTASSIUM SERPL-SCNC: 4.8 MMOL/L — SIGNIFICANT CHANGE UP (ref 3.5–5.3)
RBC # BLD: 4.59 M/UL — SIGNIFICANT CHANGE UP (ref 3.8–5.2)
RBC # BLD: 4.59 M/UL — SIGNIFICANT CHANGE UP (ref 3.8–5.2)
RBC # FLD: 13 % — SIGNIFICANT CHANGE UP (ref 10.3–14.5)
RBC # FLD: 13 % — SIGNIFICANT CHANGE UP (ref 10.3–14.5)
SODIUM SERPL-SCNC: 137 MMOL/L — SIGNIFICANT CHANGE UP (ref 135–145)
SODIUM SERPL-SCNC: 137 MMOL/L — SIGNIFICANT CHANGE UP (ref 135–145)
WBC # BLD: 6.48 K/UL — SIGNIFICANT CHANGE UP (ref 3.8–10.5)
WBC # BLD: 6.48 K/UL — SIGNIFICANT CHANGE UP (ref 3.8–10.5)
WBC # FLD AUTO: 6.48 K/UL — SIGNIFICANT CHANGE UP (ref 3.8–10.5)
WBC # FLD AUTO: 6.48 K/UL — SIGNIFICANT CHANGE UP (ref 3.8–10.5)

## 2023-12-13 PROCEDURE — 72192 CT PELVIS W/O DYE: CPT | Mod: MA

## 2023-12-13 PROCEDURE — 85025 COMPLETE CBC W/AUTO DIFF WBC: CPT

## 2023-12-13 PROCEDURE — 72192 CT PELVIS W/O DYE: CPT | Mod: 26,MA

## 2023-12-13 PROCEDURE — 36415 COLL VENOUS BLD VENIPUNCTURE: CPT

## 2023-12-13 PROCEDURE — 96374 THER/PROPH/DIAG INJ IV PUSH: CPT

## 2023-12-13 PROCEDURE — 99284 EMERGENCY DEPT VISIT MOD MDM: CPT | Mod: 25

## 2023-12-13 PROCEDURE — 99284 EMERGENCY DEPT VISIT MOD MDM: CPT

## 2023-12-13 PROCEDURE — 80048 BASIC METABOLIC PNL TOTAL CA: CPT

## 2023-12-13 RX ORDER — ACETAMINOPHEN 500 MG
1000 TABLET ORAL ONCE
Refills: 0 | Status: COMPLETED | OUTPATIENT
Start: 2023-12-13 | End: 2023-12-13

## 2023-12-13 RX ORDER — OXYCODONE HYDROCHLORIDE 5 MG/1
5 TABLET ORAL ONCE
Refills: 0 | Status: DISCONTINUED | OUTPATIENT
Start: 2023-12-13 | End: 2023-12-13

## 2023-12-13 RX ORDER — OXYCODONE HYDROCHLORIDE 5 MG/1
1 TABLET ORAL
Qty: 10 | Refills: 0
Start: 2023-12-13

## 2023-12-13 RX ADMIN — Medication 500 MILLIGRAM(S): at 10:29

## 2023-12-13 RX ADMIN — OXYCODONE HYDROCHLORIDE 5 MILLIGRAM(S): 5 TABLET ORAL at 13:19

## 2023-12-13 RX ADMIN — Medication 400 MILLIGRAM(S): at 14:24

## 2023-12-13 NOTE — ED PROVIDER NOTE - PROGRESS NOTE DETAILS
no acute findings on CT.  pain improved, though not resolved.  Pt prefers to go home with analgesia.  Will dc with cane and PMD follow  up.

## 2023-12-13 NOTE — ED PROVIDER NOTE - CLINICAL SUMMARY MEDICAL DECISION MAKING FREE TEXT BOX
Patient presenting with left lower back/left gluteus pain for past few days.  Neurovascularly intact without red flag symptoms.  Had imaging a few days ago which did not show any acute findings.  Is only trying muscle relaxers at home.  Will give NSAIDs in the emergency department and reevaluate.

## 2023-12-13 NOTE — ED ADULT NURSE NOTE - NSFALLUNIVINTERV_ED_ALL_ED
Bed/Stretcher in lowest position, wheels locked, appropriate side rails in place/Call bell, personal items and telephone in reach/Instruct patient to call for assistance before getting out of bed/chair/stretcher/Non-slip footwear applied when patient is off stretcher/[place] to call system/Physically safe environment - no spills, clutter or unnecessary equipment/Purposeful proactive rounding/Room/bathroom lighting operational, light cord in reach Bed/Stretcher in lowest position, wheels locked, appropriate side rails in place/Call bell, personal items and telephone in reach/Instruct patient to call for assistance before getting out of bed/chair/stretcher/Non-slip footwear applied when patient is off stretcher/[place] to call system/Physically safe environment - no spills, clutter or unnecessary equipment/Purposeful proactive rounding/Room/bathroom lighting operational, light cord in reach

## 2023-12-13 NOTE — ED PROVIDER NOTE - PATIENT PORTAL LINK FT
You can access the FollowMyHealth Patient Portal offered by [facility] by registering at the following website: [email or web address]. By joining [employer]’s FollowMyHealth portal, you will also be able to view your health information using other applications (apps) compatible with our system. You can access the FollowMyHealth Patient Portal offered by [facility] by registering at the following website: [email or web address]. By joining [employer]’s FollowMyHealth portal, you will also be able to view your health information using other applications (apps) compatible with our system.

## 2023-12-13 NOTE — ED PROVIDER NOTE - OBJECTIVE STATEMENT
63-year-old woman presenting complaining of left lower back pain.  Was seen in the emergency department for same 2 days ago.  She reports that she has been trying cyclobenzaprine as prescribed without any relief.  She denies any loss of sensation.  She denies any loss of bowel or bladder fall bladder function.  She has not been taking any pain medications only cyclobenzaprine.

## 2023-12-13 NOTE — ED PROVIDER NOTE - PHYSICAL EXAMINATION
Exam:  General: Patient well appearing, vital signs within normal limits  HEENT: airway patent with moist mucous membranes  Cardiac: RRR with strong peripheral pulses  Neuro: no gross neurologic deficits, strength 5/5, sensation to light touch intact  MSK: no midline spinal tenderness, tender to palpation in L obturator region reproduces complaint  Skin: warm, well perfused  Psych: normal mood and affect

## 2023-12-13 NOTE — ED PROVIDER NOTE - NSFOLLOWUPINSTRUCTIONS_ED_ALL_ED_FT
Dolor musculoesquelético  Musculoskeletal Pain  "Dolor musculoesquelético" hace referencia a los anayeli y las molestias en los huesos, las articulaciones, los músculos y los tejidos que los rodean. [place] dolor puede ocurrir en cualquier parte del cuerpo. Puede durar un breve período (acacia) o prolongarse mucho tiempo (crónico).    Es posible que se realicen un examen físico, análisis de laboratorio y estudios de diagnóstico por imágenes para encontrar la causa del dolor musculoesquelético.    Siga estas instrucciones en dixon casa:  Estilo de karma    Trate de controlar o reducir los niveles de estrés. El estrés aumenta la tensión muscular y puede empeorar el dolor musculoesquelético. Es importante reconocer cuando está ansioso o estresado y aprender distintas formas de controlar katrina estado. [place] puede incluir:  Yoga o meditación.  Terapia cognitiva o conductual.  Acupuntura o terapia de masajes.  Podrá seguir con todas las actividades, a menos que estas le generen más dolor. Cuando el dolor disminuya, retome las actividades habituales de a poco. Aumente gradualmente la intensidad y la duración de las actividades o del ejercicio que realice.  Control del dolor, la rigidez y la hinchazón        El tratamiento puede incluir medicamentos para el dolor y la inflamación que se lizett por boca o que se aplican sobre la piel. Use los medicamentos de venta alo y los recetados solamente cabrera se lo haya indicado el médico.  Si el dolor es intenso, el reposo en cama puede ser beneficioso. Acuéstese o siéntese en cualquier posición que sea cómoda, nithya salga de la cama y camine al menos cada dos horas.  Si se lo indican, aplique calor en la julianna afectada con la frecuencia que le haya indicado el médico. Use la kait de calor que el médico le recomiende, cabrera kala compresa de calor húmedo o kala almohadilla térmica.  Coloque kala toalla entre la piel y la kait de calor.  Aplique calor doc 20 a 30 minutos.  Retire la kait de calor si la piel se pone de color dickson brillante. [place] es especialmente importante si no puede sentir dolor, calor o frío. Puede correr un riesgo mayor de sufrir quemaduras.  Si se lo indican, aplique hielo sobre la julianna dolorida. Para hacer esto:  Ponga el hielo en kala bolsa plástica.  Coloque kala toalla entre la piel y la bolsa.  Aplique el hielo doc 20 minutos, 2 o 3 veces por día.  Retire el hielo si la piel se pone de color dickson brillante. [place] es muy importante. Si no puede sentir dolor, calor o frío, tiene un mayor riesgo de que se dañe la julianna.  Indicaciones generales    El médico puede recomendarle que consulte a un fisioterapeuta. Esta persona puede ayudarlo a elaborar un programa de ejercicios seguro.  Si se lo indica el médico, rebeca ejercicios de fisioterapia para mejorar el movimiento y la fuerza de la julianna afectada.  Cumpla con todas las visitas de seguimiento. [place] es importante. [place] incluye las visitas al fisioterapeuta.  Comuníquese con un médico si:  El dolor empeora.  Los medicamentos no alivian el dolor.  No puede usar la parte del cuerpo que le duele, cabrera un brazo, kala pierna o el josé.  Tiene dificultad para dormir.  Tiene dificultad para realizar las actividades cotidianas.  Solicite ayuda de inmediato si:  Tiene kala nueva lesión o el dolor empeora o es diferente.  Tiene adormecimiento u hormigueo en la julianna dolorida.  Resumen  "Dolor musculoesquelético" hace referencia a los anayeli y las molestias en los huesos, las articulaciones, los músculos y los tejidos que los rodean.  [place] dolor puede ocurrir en cualquier parte del cuerpo.  El médico puede recomendarle que consulte a un fisioterapeuta. Esta persona puede ayudarlo a elaborar un programa de ejercicios seguro. Rebeca ejercicios cabrera se lo haya indicado el fisioterapeuta.  Disminuya los niveles de estrés. El estrés puede empeorar el dolor musculoesquelético. Entre los métodos para disminuir el estrés se pueden mencionar la meditación, el yoga, la terapia cognitiva o conductual, la acupuntura y la terapia de masajes.  Esta información no tiene cabrera fin reemplazar el consejo del médico. Asegúrese de hacerle al médico cualquier pregunta que tenga.    Document Revised: 06/17/2021 Document Reviewed: 06/17/2021  [employer] Patient Education © 2023 [employer]  [employer] logo  Terms and Conditions  Privacy Policy  Editorial Policy  All content on this site: Copyright © 2023 Else[employer], its licensors, and contributors. All rights are reserved, including those for text and data mining, AI training, and similar technologies. For all open access content, the Creative Commons licensing terms apply.  Cookies are used by this site. To decline or learn more, visit our Cookies page. Dolor musculoesquelético  Musculoskeletal Pain  "Dolor musculoesquelético" hace referencia a los anayeli y las molestias en los huesos, las articulaciones, los músculos y los tejidos que los rodean. [place] dolor puede ocurrir en cualquier parte del cuerpo. Puede durar un breve período (acacia) o prolongarse mucho tiempo (crónico).    Es posible que se realicen un examen físico, análisis de laboratorio y estudios de diagnóstico por imágenes para encontrar la causa del dolor musculoesquelético.    Siga estas instrucciones en dixon casa:  Estilo de karma    Trate de controlar o reducir los niveles de estrés. El estrés aumenta la tensión muscular y puede empeorar el dolor musculoesquelético. Es importante reconocer cuando está ansioso o estresado y aprender distintas formas de controlar katrina estado. [place] puede incluir:  Yoga o meditación.  Terapia cognitiva o conductual.  Acupuntura o terapia de masajes.  Podrá seguir con todas las actividades, a menos que estas le generen más dolor. Cuando el dolor disminuya, retome las actividades habituales de a poco. Aumente gradualmente la intensidad y la duración de las actividades o del ejercicio que realice.  Control del dolor, la rigidez y la hinchazón        El tratamiento puede incluir medicamentos para el dolor y la inflamación que se lizett por boca o que se aplican sobre la piel. Use los medicamentos de venta alo y los recetados solamente cabrera se lo haya indicado el médico.  Si el dolor es intenso, el reposo en cama puede ser beneficioso. Acuéstese o siéntese en cualquier posición que sea cómoda, nithya salga de la cama y camine al menos cada dos horas.  Si se lo indican, aplique calor en la julianna afectada con la frecuencia que le haya indicado el médico. Use la kait de calor que el médico le recomiende, cabrera kala compresa de calor húmedo o kala almohadilla térmica.  Coloque kala toalla entre la piel y la kait de calor.  Aplique calor doc 20 a 30 minutos.  Retire la kait de calor si la piel se pone de color dickson brillante. [place] es especialmente importante si no puede sentir dolor, calor o frío. Puede correr un riesgo mayor de sufrir quemaduras.  Si se lo indican, aplique hielo sobre la julianna dolorida. Para hacer esto:  Ponga el hielo en kala bolsa plástica.  Coloque kala toalla entre la piel y la bolsa.  Aplique el hielo doc 20 minutos, 2 o 3 veces por día.  Retire el hielo si la piel se pone de color dickson brillante. [place] es muy importante. Si no puede sentir dolor, calor o frío, tiene un mayor riesgo de que se dañe la julianna.  Indicaciones generales    El médico puede recomendarle que consulte a un fisioterapeuta. Esta persona puede ayudarlo a elaborar un programa de ejercicios seguro.  Si se lo indica el médico, rebeca ejercicios de fisioterapia para mejorar el movimiento y la fuerza de la julianna afectada.  Cumpla con todas las visitas de seguimiento. [place] es importante. [place] incluye las visitas al fisioterapeuta.  Comuníquese con un médico si:  El dolor empeora.  Los medicamentos no alivian el dolor.  No puede usar la parte del cuerpo que le duele, cabrera un brazo, kala pierna o el josé.  Tiene dificultad para dormir.  Tiene dificultad para realizar las actividades cotidianas.  Solicite ayuda de inmediato si:  Tiene kala nueva lesión o el dolor empeora o es diferente.  Tiene adormecimiento u hormigueo en la julianna dolorida.  Resumen  "Dolor musculoesquelético" hace referencia a los anayeli y las molestias en los huesos, las articulaciones, los músculos y los tejidos que los rodean.  [place] dolor puede ocurrir en cualquier parte del cuerpo.  El médico puede recomendarle que consulte a un fisioterapeuta. Esta persona puede ayudarlo a elaborar un programa de ejercicios seguro. Rebeca ejercicios cabrera se lo haya indicado el fisioterapeuta.  Disminuya los niveles de estrés. El estrés puede empeorar el dolor musculoesquelético. Entre los métodos para disminuir el estrés se pueden mencionar la meditación, el yoga, la terapia cognitiva o conductual, la acupuntura y la terapia de masajes.  Esta información no tiene cabrera fin reemplazar el consejo del médico. Asegúrese de hacerle al médico cualquier pregunta que tenga.    Document Revised: 06/17/2021 Document Reviewed: 06/17/2021  [employer] Patient Education © 2023 [employer].  [employer] logo  Terms and Conditions  Privacy Policy  Editorial Policy  All content on this site: Copyright © 2023 Elsevier, its licensors, and contributors. All rights are reserved, including those for text and data mining, AI training, and similar technologies. For all open access content, the Creative Commons licensing terms apply.  Cookies are used by this site. To decline or learn more, visit our Cookies page.

## 2023-12-30 ENCOUNTER — EMERGENCY (EMERGENCY)
Facility: HOSPITAL | Age: 63
LOS: 1 days | Discharge: HOME | End: 2023-12-30
Attending: STUDENT IN AN ORGANIZED HEALTH CARE EDUCATION/TRAINING PROGRAM
Payer: MEDICAID

## 2023-12-30 VITALS
SYSTOLIC BLOOD PRESSURE: 129 MMHG | HEIGHT: 64.17 IN | DIASTOLIC BLOOD PRESSURE: 91 MMHG | WEIGHT: 156.09 LBS | HEART RATE: 75 BPM | OXYGEN SATURATION: 97 % | TEMPERATURE: 99 F | RESPIRATION RATE: 17 BRPM

## 2023-12-30 VITALS
HEART RATE: 67 BPM | SYSTOLIC BLOOD PRESSURE: 118 MMHG | OXYGEN SATURATION: 96 % | RESPIRATION RATE: 20 BRPM | DIASTOLIC BLOOD PRESSURE: 80 MMHG

## 2023-12-30 DIAGNOSIS — Z98.890 OTHER SPECIFIED POSTPROCEDURAL STATES: Chronic | ICD-10-CM

## 2023-12-30 DIAGNOSIS — Z90.49 ACQUIRED ABSENCE OF OTHER SPECIFIED PARTS OF DIGESTIVE TRACT: Chronic | ICD-10-CM

## 2023-12-30 DIAGNOSIS — Z98.49 CATARACT EXTRACTION STATUS, UNSPECIFIED EYE: Chronic | ICD-10-CM

## 2023-12-30 LAB
FLUAV AG NPH QL: DETECTED
FLUAV AG NPH QL: DETECTED
FLUBV AG NPH QL: SIGNIFICANT CHANGE UP
FLUBV AG NPH QL: SIGNIFICANT CHANGE UP
SARS-COV-2 RNA SPEC QL NAA+PROBE: SIGNIFICANT CHANGE UP
SARS-COV-2 RNA SPEC QL NAA+PROBE: SIGNIFICANT CHANGE UP

## 2023-12-30 PROCEDURE — 99284 EMERGENCY DEPT VISIT MOD MDM: CPT

## 2023-12-30 PROCEDURE — 94640 AIRWAY INHALATION TREATMENT: CPT

## 2023-12-30 PROCEDURE — 99283 EMERGENCY DEPT VISIT LOW MDM: CPT | Mod: 25

## 2023-12-30 PROCEDURE — 87637 SARSCOV2&INF A&B&RSV AMP PRB: CPT

## 2023-12-30 PROCEDURE — T1013: CPT

## 2023-12-30 RX ORDER — ACETAMINOPHEN 500 MG
975 TABLET ORAL ONCE
Refills: 0 | Status: COMPLETED | OUTPATIENT
Start: 2023-12-30 | End: 2023-12-30

## 2023-12-30 RX ORDER — ALBUTEROL 90 UG/1
2 AEROSOL, METERED ORAL ONCE
Refills: 0 | Status: COMPLETED | OUTPATIENT
Start: 2023-12-30 | End: 2023-12-30

## 2023-12-30 RX ADMIN — Medication 975 MILLIGRAM(S): at 10:46

## 2023-12-30 RX ADMIN — ALBUTEROL 2 PUFF(S): 90 AEROSOL, METERED ORAL at 10:47

## 2023-12-30 NOTE — ED ADULT NURSE NOTE - OBJECTIVE STATEMENT
From: Ashvin Tsang  To: Galina Ugalde  Sent: 5/17/2021 3:10 PM CDT  Subject: Medication Question    Hello! 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 Also I have to use a cupon to make it 50 dollars and only have 7 left of those. And without the cupon even with insurance it's 150. So for the summer I was wondering if I could switch to the equivalent 30 ir Adderall to use as needed with work? Because i can break those and not have to use the whole one if don't need it. Also my insurance covers that alot cheaper. Thank you   Pt presents to the ED c/o generalized body aches, Pt presents to the ED c/o generalized body aches, fever and cough x 3 days. Pt denies SOB and chest pain.

## 2023-12-30 NOTE — ED ADULT NURSE NOTE - NS ED NURSE DC INFO COMPLEXITY
Nurse visit Injection    Date of service: 2023    Alexi Camarillo  Is a 48 y.o.  female    PT's PCP is: Silvana Stone MD     : 1969                                             Subjective:       No LMP recorded. Allergies: Pcn [penicillins]    Chief Complaint   Patient presents with    Injections     Depo given Right Glut, Patient supplied       Last Yearly date:  10/6/22    Last pap date and results: 10/2021 Negative    Last HPV date and results:   ( if due for pap schedule pap)    LAST DEPO: 10/26/22 ( if past 13 weeks and not on period please talk with provider)      PE:  Vital Signs  not currently breastfeeding. Labs:    No results found for this visit on 23. Yes  PT denies fever, chills, nausea and vomiting                            Assessment and Plan          Diagnosis Orders   1.  Dysmenorrhea  medroxyPROGESTERone (DEPO-PROVERA) injection 150 mg            injection was: patient supplied      NURSE: Benita Sterling
Simple: Patient demonstrates quick and easy understanding/Returned Demonstration/Verbalized Understanding

## 2023-12-30 NOTE — ED PROVIDER NOTE - PROGRESS NOTE DETAILS
MD Arguello: Pt found to be FluA+. Pt was re-evaluated at bedside, VSS, feeling better overall, tolerating PO. Results were discussed with patient as well as return precautions and follow up plan with PCP. Time was taken to answer any questions that the patient had before providing them with discharge paperwork.

## 2023-12-30 NOTE — ED PROVIDER NOTE - NSFOLLOWUPINSTRUCTIONS_ED_ALL_ED_FT
Tienes un síndrome viral. [place] puede durar de 5 a 10 agata. Alterne Tylenol y Motrin cada 4 a 6 horas para controlar la fiebre, así cabrera los anayeli corporales y los escalofríos. Beber mucho líquido. Descansar. Regrese a la grace de emergencias si la condición empeora o si aparecen nuevos síntomas preocupantes. Rebeca un seguimiento con dixon médico habitual.    Kala infección viral puede ser causada por diferentes tipos de virus. La mayoría de las infecciones virales no son graves y se resuelven por sí solas. Sin embargo, algunas infecciones pueden causar síntomas graves y provocar más complicaciones.    .SÍNTOMAS Los virus frecuentemente pueden causar: Dolor leve de garganta. Achaques. Anayeli de pablo. Rinorrea. Diferentes tipos de erupciones. Ojos llorosos. Cansancio. Tos. Pérdida de apetito. Infecciones gastrointestinales, que provocan náuseas, vómitos y diarrea. Estos síntomas no responden a los antibióticos porque la infección no es causada por bacterias. Sin embargo, es posible que contraiga kala infección bacteriana después de la infección viral. A esto a veces se le llama "superinfección".    Los síntomas de dicha infección bacteriana pueden incluir: Empeoramiento del dolor de garganta con pus y dificultad para tragar. Glándulas del josé inflamadas. Escalofríos y fiebre bernie o persistente. Dolor de pablo intenso. Sensibilidad sobre los senos nasales. Sensación de malestar general persistente (malestar), anayeli musculares y cansancio (fatiga). Tos persistente. Producción de moco amarillo, danielle o marrón al toser.    INSTRUCCIONES PARA EL CUIDADO EN EL HOGAR [place] únicamente medicamentos recetados o de venta alo para el dolor, el malestar, la diarrea o la fiebre según las indicaciones de dixon médico. Donna suficiente agua y líquidos para mantener la orina angela o de color amarillo pálido. Las bebidas deportivas pueden proporcionar valiosos electrolitos, azúcares e hidratación. Descanse lo suficiente y mantenga kala nutrición adecuada. Las sopas y caldos con galletas saladas o arroz están nubia.    BUSQUE ATENCIÓN MÉDICA INMEDIATA SI: Tiene minnie anayeli de pablo, dificultad para respirar, dolor en el pecho, dolor en el josé o un sarpullido inusual. Tiene vómitos incontrolados, diarrea o no puede retener líquidos. Tienes un síndrome viral. [place] puede durar de 5 a 10 agata. Alterne Tylenol y Motrin cada 4 a 6 horas para controlar la fiebre, así cabrera los anayeli corporales y los escalofríos. Beber mucho líquido. Descansar. Regrese a la grace de emergencias si la condición empeora o si aparecen nuevos síntomas preocupantes. Rebeca un seguimiento con dixon médico habitual.    Kala infección viral puede ser causada por diferentes tipos de virus. La mayoría de las infecciones virales no son graves y se resuelven por sí solas. Sin embargo, algunas infecciones pueden causar síntomas graves y provocar más complicaciones.    .SÍNTOMAS Los virus frecuentemente pueden causar: Dolor leve de garganta. Achaques. Anayeli de pablo. Rinorrea. Diferentes tipos de erupciones. Ojos llorosos. Cansancio. Tos. Pérdida de apetito. Infecciones gastrointestinales, que provocan náuseas, vómitos y diarrea. Estos síntomas no responden a los antibióticos porque la infección no es causada por bacterias. Sin embargo, es posible que contraiga kala infección bacteriana después de la infección viral. A esto a veces se le llama "superinfección".    Los síntomas de dicha infección bacteriana pueden incluir: Empeoramiento del dolor de garganta con pus y dificultad para tragar. Glándulas del josé inflamadas. Escalofríos y fiebre bernie o persistente. Dolor de pablo intenso. Sensibilidad sobre los senos nasales. Sensación de malestar general persistente (malestar), anayeli musculares y cansancio (fatiga). Tos persistente. Producción de moco amarillo, danielle o marrón al toser.    INSTRUCCIONES PARA EL CUIDADO EN EL HOGAR [place] únicamente medicamentos recetados o de venta alo para el dolor, el malestar, la diarrea o la fiebre según las indicaciones de dixon médico. Donna suficiente agua y líquidos para mantener la orina angela o de color amarillo pálido. Las bebidas deportivas pueden proporcionar valiosos electrolitos, azúcares e hidratación. Descanse lo suficiente y mantenga kala nutrición adecuada. Las sopas y caldos con galletas saladas o arroz están nubia.    BUSQUE ATENCIÓN MÉDICA INMEDIATA SI: Tiene minnie anayeli de pablo, dificultad para respirar, dolor en el pecho, dolor en el josé o un sarpullido inusual. Tiene vómitos incontrolados, diarrea o no puede retener líquidos.

## 2023-12-30 NOTE — ED PROVIDER NOTE - CLINICAL SUMMARY MEDICAL DECISION MAKING FREE TEXT BOX
63-year-old female, denies past medical history, presents to ED complaining of bodyaches, cough, fever x 4 days.  Patient denies any recent travel or sick contacts.  Patient states symptoms started after her recent doctor visit.  Patient has not taken her temperature at home, however states feels chills.  Patient is not a smoker.  Denies chest pain, shortness of breath, abdominal pain, diarrhea, urinary symptoms, lightheadedness/dizziness, weakness/numbness, rashes or any other symptoms at this time.    Vital signs stable.  Physical exam significant for well-appearing female in no acute distress.  Head is normocephalic/atraumatic.  Extraocular movements intact.  No conjunctival pallor.  Pupils equal round and reactive to light.  Oropharynx is clear with moist mucous membranes.  Heart is regular rate and rhythm without murmur.  Lungs clear to auscultation bilaterally.  No wheezing/rhonchi/rales.  Abdomen is soft and nontender/nondistended.  No lower extremity edema.  Pulses are 2+ throughout.  Skin is warm and well-perfused.  Neuroexam is nonfocal.  Otherwise unremarkable exam.    History and physical most consistent with viral syndrome.  Low suspicion for bacterial cause or pneumonia at this time.  Plan to check flu/COVID viral swab.  Will provide Tylenol and albuterol inhaler for symptom relief.  Likely will dispo home upon reassessment.

## 2023-12-30 NOTE — ED PROVIDER NOTE - PATIENT PORTAL LINK FT
You can access the FollowMyHealth Patient Portal offered by [facility] by registering at the following website: [email or web address]. By joining [email or web address]’s FollowMyHealth portal, you will also be able to view your health information using other applications (apps) compatible with our system. You can access the FollowMyHealth Patient Portal offered by [facility] by registering at the following website: [email or web address]. By joining [employer]’s FollowMyHealth portal, you will also be able to view your health information using other applications (apps) compatible with our system.

## 2023-12-30 NOTE — ED ADULT NURSE NOTE - NSFALLUNIVINTERV_ED_ALL_ED
Bed/Stretcher in lowest position, wheels locked, appropriate side rails in place/Call bell, personal items and telephone in reach/Instruct patient to call for assistance before getting out of bed/chair/stretcher/Non-slip footwear applied when patient is off stretcher/[place] to call system/Physically safe environment - no spills, clutter or unnecessary equipment/Purposeful proactive rounding/Room/bathroom lighting operational, light cord in reach Bed/Stretcher in lowest position, wheels locked, appropriate side rails in place/Call bell, personal items and telephone in reach/Instruct patient to call for assistance before getting out of bed/chair/stretcher/Non-slip footwear applied when patient is off stretcher/[place] to call system/Physically safe environment - no spills, clutter or unnecessary equipment/Purposeful proactive rounding/Room/bathroom lighting operational, light cord in reach

## 2024-09-10 NOTE — ED ADULT NURSE NOTE - CAS EDP DISCH TYPE
Felicita from [facility] said pts Bp is 175/100 and the other reading  is 163-90 today. PT is refusing to go to ER. Felicita number is [phone]    Home

## 2024-10-01 NOTE — ED ADULT NURSE NOTE - ADDITIONAL COMPLAINTS
Allergy and Immunology  Consult Note:    Chief Complaint   Patient presents with    Consultation     Recurrent sicknedd for 1 yr. Abnormal CBC. Mass on lung and kidneys, multiple allergies, pt is concerned that it could be immunology related.     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 A copy of this note has been sent back to the provider.    HPI:  Marisela Eldridge is a 59 year old female with past medical history of lung blebs presenting for evaluation of low IgM level.    Patient with complex history, reports having lymph node swelling 2 days after taking doxycycline last year. Reports easy susceptibility to infections in past 2 years ever since. Denies recurrent pneumonias, UTIs, or skin abscesses. Can have about 2 episodes of sinusitis per year.     For past 2 years lymphocyte and platelet levels have been off. She has a new mass found on left kidney on recent CT can. Also has blebs/nodules on lungs for several years. Has had 17 polyps and 19 polyps on colonoscopy. Some have been precancerous.  Patient has been evaluated with ENT, neurology, hematology/oncology, rheumatology, now allergy and will be seeing endocrinology as well. IgA and IgG have been normal, but IgM is low. Lymphocyte levels have been elevated until recently. Flow cytometry 6/8/23 within normal limits for B and T cells.   Patient is not sure if a preceding COVID infection would have triggered her trouble. She has had COVID vaccinations x 2 (Judson and Judson and Moderna) in the past.     Of note, patient also with persistent thrombocytosis, seen and evaluated by hematology and had negative testing to JAK2, CALR, MPL mutations and was suspected to be a likely reactive process.     Reaction to sun:  Patient reports she will stop breathing if she is out in direct sunlight for extended amount of time. Patient reports one episode of lip swelling when out in sun for long time.     At 12-13  years old had an enlarged left ovary, that had to be removed (noncancerous that she thinks), and reports needing gammaglobulin injections for weeks afterwards (she reports they were not transfusions). Eventually had her right ovary removed about 20 years later (after having kids) due to persistent vaginal bleeding after her second daughter and was getting too anemic.     Rhinitis:  Symptoms present for whole life.  Patient denies prior allergy testing.   Patient reports prior allergen immunotherapy.   Typical symptoms include sneezing, nasal congestion, post nasal drip, throat clearing, itchy nose, sinus pressure, itchy eyes, watery eyes, itchy throat, and deviated nasal septum .   Patient denies history of rhinorrhea, itchy ears, decreased sense of smell, deviated septum, and snoring.   Symptoms are present year round  History of GERD: None.   Allergic triggers for symptoms: dust, mold/damp environments, and weeds.  Non-allergic triggers for symptoms: virus URIs and rainy days.  Vasomotor triggers for symptoms: rapid temperature change and running.  Episodes of sinusitis:  yes, 2 a year.  Prior sinus surgery: yes, 1995 for broken nose.  History of pneumonia: yes.  History of otitis media: Recurrent infections.  Has had broken nose repeatedly by accident. In 1997 nasal bridge did collapse from injury and repair. Has had CT and ENT follow up and no concerning issues at this time, but has had sinus symptoms since.   Patient is a current everyday smoker since 1982    Current regimen:  -- Flonase needed can help her breath better at night to sleep and help with post nasal drip  -- NyQuil with weather change  -- Chloro tabs as needed    Medications tried:   -- Singulair did not help    Environmental exposures:  Age of home: patient does not specify.   Type of home: house  Type of heating/cooling system in home: forced air and central air  Pets: Yes, 2 cats.  Pets allowed in bedroom: Yes  Smoking exposure: Yes    Riji-zm-ijgo carpet: Yes  Mold/mildew damage: No    Use of humidifier: Yes   Use of de-humidifier: No  Use of feather/down pillows, blankets, or jackets: No   Use of dust mite covers: Yes  Cockroaches or mice exposure: No        Asthma:  Patient born full term. No respiratory support needed at birth.   Patient given asthma diagnosis at age 11-12 years.  Typical symptoms: cough, wheeze, shortness of breath, and chest tightness, but she attributes this more to the lung blebs.   Typical triggers: URIs, exercise, and changes in weather  Average use of oral steroids for breathing exacerbations: Yes but patient unsure how often  History of ED visits for breathing exacerbations: None.  History of hospitalizations for breathing exacerbations: None.  History of intubations for severe breathing exacerbations: None.   History of GERD: None  Smoker for over 25 years, current every day smoker    Current regimen:  -- Advair 1 puff every other evening (if she takes more she gets oral thrush)  -- albuterol as needed (not needed in over a year)    Previous medications tried:   -- same as above    Asthma Control Test Results 10/1/24     Score   In the past 4 weeks, how much of the time did your asthma keep you from getting as much done at work, school or at home?: None of the time 5         During the past 4 weeks, how often have you had shortness of breath?: More than once a day 1         During the past 4 weeks, how often did your asthma symptoms wake you up at night or earlier than usual in the morning?: Not at all 5         During the past 4 weeks, how often have you used your rescue inhaler or nebulizer medication (such as albuterol)?: Not at all 5         How would you rate your asthma control during the past 4 weeks?: Completely controlled 5          Total Score: 21           A total score of 20 - 25 indicates the asthma seems to be well controlled.  A total score of 5 - 19 indicates the asthma may not be under  control.    Reactions to foods:  Mushrooms: vomiting diarrhea about 30 minutes after ingestion  Cow's milk in large quantity can cause nausea and vomiting  Tomatoes in ketchup tolerated    Patient denies any history of eczema, recurrent urticaria or angioedema, latex allergy.     ALLERGIES:   Allergen Reactions    Bee ANAPHYLAXIS     PATIENT IS ALSO ALLERGIC TO THE SUN    Benadryl Allergy ANAPHYLAXIS    Nsaids ANAPHYLAXIS     Zomax, Darvon. Can take ibuprofen, naproxen    Penicillins ANAPHYLAXIS    Propoxyphene ANAPHYLAXIS    Phenergan Dm HIVES and VOMITING    Mushroom Extract Complex   (Food Or Med) VOMITING and DIARRHEA    Red Beet Powder   (Food Or Med) VOMITING and DIARRHEA    Tomato   (Food Or Med) VOMITING and DIARRHEA    Unknown Other (See Comments)     Patient states the Sun        Current Outpatient Medications   Medication Sig    polyethylene glycol (MIRALAX) 17 GM/SCOOP powder Take as directed. Colonoscopy instructions.    bisacodyl (DULCOLAX) 5 MG EC tablet Use as directed. Colonoscopy instructions.    Simethicone 125 MG Tab Take as direct. Colonoscopy instructions.    fluticasone-salmeterol 100-50 MCG/ACT inhaler Inhale 1 puff into the lungs in the morning and 1 puff in the evening. (Patient taking differently: Inhale 1 puff into the lungs at bedtime. Every other day due to thrush)    Fluticasone Propionate (FLONASE NA) Spray 2 Squirts in each nostril nightly.    albuterol 108 (90 Base) MCG/ACT inhaler Inhale 2 puffs into the lungs every 4 hours as needed for Shortness of Breath or Wheezing. (Patient not taking: Reported on 10/1/2024)    IBUPROFEN PO  (Patient not taking: Reported on 10/1/2024)    Acetaminophen (TYLENOL PO)  (Patient not taking: Reported on 10/1/2024)    Cholecalciferol (VITAMIN D3 PO)  (Patient not taking: Reported on 10/1/2024)     No current facility-administered medications for this visit.       HISTORY:  Past Medical History:   Diagnosis Date    Allergy     Asthma (CMD)      Disorder of bone and cartilage, unspecified 01/06/2009    History of colon polyps     History of pneumothorax     Osteoporosis        Past Surgical History:   Procedure Laterality Date    Breast cyst aspiration      Colonoscopy remove lesions by snare  12/22/2008    Hysterectomy      1991    Lung surgery      pleurodesis due to lung collapse    Lung surgery      PEURODESIS    Nose surgery         Social History     Socioeconomic History    Marital status:      Spouse name: Not on file    Number of children: Not on file    Years of education: Not on file    Highest education level: Not on file   Occupational History    Occupation: psychologist     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   Tobacco Use    Smoking status: Every Day     Current packs/day: 0.50     Average packs/day: 0.5 packs/day for 42.8 years (21.4 ttl pk-yrs)     Types: Cigarettes     Start date: 1982    Smokeless tobacco: Never    Tobacco comments:     Currently 3 cigarettes per day reported 8/8/23   Vaping Use    Vaping status: never used   Substance and Sexual Activity    Alcohol use: Not Currently     Comment: 2 drinks a year    Drug use: Not Currently    Sexual activity: Not on file   Other Topics Concern    Not on file   Social History Narrative    Not on file     Social Determinants of Health     Financial Resource Strain: Low Risk  (9/4/2024)    Financial Resource Strain     Unable to Get: None   Food Insecurity: Low Risk  (9/4/2024)    Food Insecurity     Worried about Food: Never true     Food is Gone: Never true   Transportation Needs: Not At Risk (9/4/2024)    Transportation Needs     Lack of Reliable Transportation: No   Physical Activity: Medium Risk (9/4/2024)    Exercise Vital Sign     Days of Exercise per Week: 3 days     Minutes of Exercise per Session: 30 min   Stress: Medium Risk (9/4/2024)    Stress     How Stressed: Somewhat   Social Connections: Low Risk  (9/4/2024)    Social Connections     Social Connectivity: 5 or  more times a week   Interpersonal Safety: Not on file (5/17/2021)       Family History   Problem Relation Age of Onset    Angioedema Mother     Allergic Rhinitis Mother     Colon Polyps Mother     Stroke Mother     Cancer Mother         spine or mets to spine?    Hypertension Mother     Dementia/Alzheimers Mother     Thyroid Mother     Other Mother         prediabetic    Colon Polyps Father     Cancer Father         lung, skin, past smoker and served in [employer]    Kidney disease Father     Angioedema Sister     Eczema Sister     Allergic Rhinitis Sister     Neurological Disorder Sister         fibromyalgia    Kidney disease Sister         born with one nonfunctioning kidney    Cancer Sister 50        ovarian or cervical    Genetic Disorder Daughter         Trevino syndrome per patient report    Urolithiasis Daughter     Other Daughter         Hypophosphatasia- genetic soft bone disease (inherited from father)    Neurofibromatosis Daughter     Diabetes Daughter     Cancer, Breast Maternal Aunt     Cancer Paternal Uncle 60        brain    Heart disease Maternal Grandmother     Heart disease Paternal Grandmother     Dementia/Alzheimers Maternal Grandfather     Cancer, Prostate Maternal Cousin     Asthma Neg Hx     Immunodeficiency Neg Hx     Urticaria Neg Hx         ROS:  Constitutional Symptoms: No fevers, chills, or unexpected weight loss.  ENT: see HPI  Respiratory: see HPI  Cardiovascular: No edema or arrhythmias.  Gastrointestinal: No abdominal pain, diarrhea, or constipation.  Musculoskeletal: No decreased range of motion, joint swelling or pain.  Integumentary: No rashes.  Neurological: No numbness or tingling.  Psychiatric: No recent mood changes or history of anxiety.  Allergic/Immunologic: see HPI.      PHYSICAL EXAM:  Vitals: Visit Vitals  BP (!) 90/62   Pulse 90   Resp 16   SpO2 93%      Constitutional: vitals as above, no obvious deformities  Eyes: pupils equal, round, and reflective to light bilaterally, no  conjunctival injection, no swelling or erythema of eyelids, no allergic shiners  Ears: tympanic membranes reflective to light bilaterally, no erythema, no bulging  Nose: inferior nasal turbinates pink, non- edematous bilaterally, no transverse nasal crease, no polyps   Oropharynx: oral mucous membranes moist, no cobblestoning, no edema  Lymphatics: no submandibular or supraclavicular lymphadenopathy  Respiratory: No respiratory distress, good aeration bilaterally, no prolonged expiratory phase, no wheezes  Cardiovascular: Regular rate and rhythm, no edema  Musculoskeletal: no abnormal gait, no clubbing of nails  Skin: warm, no rashes  Psychiatric: appropriate judgement, mood, and affect    LABS:   Latest Reference Range & Units 06/08/23 15:12 11/14/23 09:06   IGA 70 - 400 mg/dL 172 170   IGM 40 - 230 mg/dL 23 (L) 20 (L)   Kappa/ Lambda Ratio 0.26 - 1.65  1.15    Kappa, Free 0.33 - 1.94 mg/dL 1.43    Lambda, Free 0.57 - 2.63 mg/dL 1.24    PLASMA CELL PROFILE  Rpt !    (L): Data is abnormally low  !: Data is abnormal  Rpt: View report in Results Review for more information    6/8/23:  PERIPHERAL BLOOD  CBC:     WBC: 11.4 K/uL   RBC:              4.65 M/uL   Hemoglobin: 14.9 g/dL   HCT: 44.1%   MCV: 94.8 fl   RDW-CV: 13.7%   Platelet Count: 470 K/uL   Retic (Absolute): N/A   Retic (Observed): N/A   Nucleated RBC: 0/100   Neutrophils: 34%   Bands: 0%   Lymphocyte:              55%   Reactive Lymphocytes: 0%   Monocyte: 8%   Eosinophil: 2%   Basophil: 1%   Metamyelocyte: 0%   Myelocyte: 0%   Promyelocyte: 0%   Blast:              0%         Latest Reference Range & Units 06/08/23 15:12 10/23/23 13:22 03/14/24 10:29 09/06/24 09:59   WBC 4.2 - 11.0 K/mcL 11.4 (H) 10.8 9.2 8.5   RBC 4.00 - 5.20 mil/mcL 4.65 4.77 4.55 4.36   HGB 12.0 - 15.5 g/dL 14.9 15.4 15.0 14.2   HCT 36.0 - 46.5 % 44.1 45.6 44.0 42.7   MCV 78.0 - 100.0 fl 94.8 95.6 96.7 97.9   MCH 26.0 - 34.0 pg 32.0 32.3 33.0 32.6   MCHC 32.0 - 36.5 g/dL 33.8 33.8 34.1  33.3   RDW-SD 39.0 - 50.0 fL 48.3 48.9 49.0 52.0 (H)   RDW-CV 11.0 - 15.0 % 13.7 13.7 13.8 14.3    - 450 K/mcL 470 (H) 480 (H) 485 (H) 491 (H)   NRBC <=0 /100 WBC 0 0 0 0   Neutrophil % 41 44 36 43   LYMPH % 50 46 53 45   MONO % 6 7 7 8   EOSIN % 2 2 3 3   BASO % 1 1 1 1   Absolute Neutrophil 1.8 - 7.7 K/mcL 4.6 4.7 3.3 3.6   Absolute Lymph 1.0 - 4.0 K/mcL 5.7 (H) 4.9 (H) 4.9 (H) 3.9   Absolute Mono 0.3 - 0.9 K/mcL 0.6 0.8 0.6 0.7   Absolute Eos 0.0 - 0.5 K/mcL 0.2 0.2 0.3 0.2   Absolute Baso 0.0 - 0.3 K/mcL 0.1 0.1 0.1 0.1   Immature Granulocytes % 0 0 0 0   Absolute Immature Granulocytes 0.0 - 0.2 K/mcL 0.0 0.0 0.0 0.0   (H): Data is abnormally high    11/14/23  Component  Ref Range & Units 10 mo ago 1 yr ago   Immunoglobulin G  700 - 1,600 mg/dL 781 807   Immunoglobulin A  70 - 400 mg/dL 170 172   Immunoglobulin M  40 - 230 mg/dL 20 Low  23 Low      6/8/23 Flow Cytometry:  Flow cytometric analysis  Cytometric analysis was performed utilizing the following antibodies:  (CD3, CD4, CD5, CD7, CD8, CD10, CD14, CD19, CD20, CD22, CD23, CD25, CD34, CD38, CD45, sKappa, sLambda)       Comment:  A mixed population of T and B-lymphocytes, predominantly T-cells, is isolated from the peripheral blood by whole blood lysis technique. T-lymphocytes (CD3+CD5+CD7+) account for 32.1% of all cells isolated, are of normal size by forward light scatter (FSC) parameters, and are comprised by T-helper (CD3+CD4+) and T-suppressor (CD3+CD8+) subsets in a ratio of 2.6.     B-lymphocytes (CD19 + CD20 + CD22 + CD23 +) account for 8.1% of all cells isolated, are of normal size by forward light scatter (FSC) parameters, do not coexpress CD5 or CD10, are CD25- and show no evidence for monoclonality by their expression of light chain surface immunoglobulin.      Significant numbers of CD38+ or CD34+ cells are not seen.     Peripheral blood, flow cytometric analysis:  -Population of normal size polyclonal B cells without immunophenotypic  aberrancy.  -Population of normal size heterogeneous T cells without immunophenotypic aberrancy.    ASSESSMENT/PLAN:  Marisela Eldridge is a 59 year old female with past medical history of lung blebs, persistent thrombocytosis with low IgM level and other concerns addressed today.    IgM deficiency  (CMD)  (primary encounter diagnosis)  Discussed that low IgM can sometimes be incidental findings in patients without any symptoms. In others, autoimmune processes can sometimes be seen, along with increased frequency in infections. Patient without history concerning for frequent, severe, or persistent infections reassuring. Also reassuring that patient with normal IgG and IgA. IgM levels may decrease with age. Reassuring that patient does not have a complete deficiency with levels less than 5 mg/dL. Thus far, patient's evaluation has been unremarkable. Discussed completing her humoral immune function evaluation with further testing as below to assess her susceptibility to infections. Also discussed optimizing control of her rhinoconjunctivitis as below to prevent sinusitis.   Plan:   -- send Diphtheria and Tetanus Antibodies, IgG,         Immunoglobulins Quantitative, Streptococcus         Pneumoniae Antibodies, IgG 23 Serotypes    Rhinoconjunctivitis  Patient's sinus and ocular symptoms likely due to allergic and/or non-allergic (chemical irritant/weather change/motion) triggers. Not yet optimally controlled. Will send IgE testing to assess for allergic triggers, and optimize control with recommendations below.  Plan:   -- Allergen: Respiratory Panel Region 8  This steroid nasal spray helps more for stuffy nose and sinus pressure:             -- Flonase (fluticasone), 1 spray in each nostril 1-2 times a day scheduled or as needed  This antihistamine nasal spray helps more for runny nose or post nasal drip:            -- Astelin (Azelastine) 1- 2 spray per nostril 1-2 times a day scheduled or as needed (prescription  given)  -- if symptoms still not improved, call allergy clinic to discuss   -- avoidance measures as per AVS  -- nasal saline spray and/or irrigation as needed prior to nasal medications  -- nasal spray instructions as per AVS    Persistent asthma without complication, unspecified asthma severity (CMD)  Moderately controlled. Can try to improve control with changing from dry powder HFA inhaler with spacer use to reduce recurrence of oral thrush.   Plan:  -- replace Advair with Symbicort 160-4.5 mcg 1 puff BID with spacer, increase to 2 puff BID if not controlled             -- rinse mouth or brush teeth after using your daily inhaler   -- use with spacer if instructed by your doctor  -- use albuterol 2 puffs every 4-6 hours as needed for cough, wheeze, shortness  of breath, or chest tightness  -- use albuterol 2 puffs 20 minutes prior to exercise IF NEEDED  -- notify allergy clinic if you are using albuterol for more than 3 days  -- recommend yearly influenza (\"flu\") shots    Tobacco use  Encourage smoking cessation to improve control of rhinoconjunctivitis, asthma, and reduce frequency of sinopulmonary infections.    Return in 3 month(s) or sooner as needed.    Patient to call in 1 month if no improvement.     Bernardo Hansen MD  Allergy, Asthma, and Immunology     Additional Complaints